# Patient Record
Sex: MALE | Race: WHITE | NOT HISPANIC OR LATINO | Employment: FULL TIME | URBAN - METROPOLITAN AREA
[De-identification: names, ages, dates, MRNs, and addresses within clinical notes are randomized per-mention and may not be internally consistent; named-entity substitution may affect disease eponyms.]

---

## 2018-01-10 NOTE — PROGRESS NOTES
Assessment    1  Central serous retinopathy, left (362 41) (H35 712)    Plan  Central serous retinopathy, left    · *1 - [address] Physician Referral  Consult  Status: Hold For - Scheduling   Requested for: [id number]  Care Summary provided  : Yes   · (1) CORTISOL AM SPECIMEN; Status:Active; Requested LTU:[id number];     Discussion/Summary    Pt would prefer home sleep study for CHRIS  Chief Complaint  Here to discuss sleep study order  er/cma  History of Present Illness  left eye issue  chris and cortisol levels recommended  stress at work  shift work  had left eye blurriness, right eye was ok, at night  not as bad in daytime  saw retinal specialist after seeing Dr Edin Rivera  snreagan  not sure if apnea  wants home study  normal labs in past  no wt change or striae       Review of Systems    Cardiovascular: no chest pain  Respiratory: no shortness of breath  Gastrointestinal: no abdominal pain  Active Problems    1  Acute maxillary sinusitis, recurrence not specified (461 0) (J01 00)   2  Central serous retinopathy, left (362 41) (H35 712)   3  Cerumen impaction (380 4) (H61 20)   4  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) (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)   5  Herpes simplex type 1 infection (054 9) (B00 9)   6  Immunization due (V05 9) (Z23)   7  Keloid scar (701 4) (L91 0)   8  Lipoma (214 9) (D17 9)   9  Lumbago (724 2) (M54 5)   10  Screening for diabetes mellitus (DM) (V77 1) (Z13 1)   11  Screening for lipid disorders (V77 91) (Z13 220)   12  Sebaceous cyst (706 2) (L72 3)    Past Medical History    1  History of Acute gastroenteritis (558 9) (K52 9)   2  Acute otitis media (382 9) (H66 90)   3  History of Acute upper respiratory infection (465 9) (J06 9)   4  Contact dermatitis due to plant (692 6) (L25 5)   5  History of Difficulty breathing (786 09) (R06 89)   6  Generalized abdominal pain (789 07) (R10 84)   7  History of acute bronchitis (V12 69) (Z87 09)   8  History of acute bronchitis (V12 69) (Z87 09)   9  History of acute sinusitis (V12 69) (Z87 09)   10  History of urticaria (V13 3) (Z87 2)   11  History of viral warts (V12 09) (Z86 19)   12  History of Nonvenomous Insect Bite Of Trunk (911 4)    Family History    1  Family history of malignant neoplasm of breast (V16 3) (Z80 3)    2  Family history of hypothyroidism (V18 19) (Z83 49)    The family history was reviewed and updated today  Social History    · Never A Smoker  The social history was reviewed and is unchanged  Current Meds   1  No Reported Medications Recorded   2  No Reported Medications Recorded    Allergies    1  No Known Drug Allergies    Vitals  Vital Signs [Data Includes: Current Encounter]    Recorded: [id number] 10:31AM   Temperature 97 7 F   Heart Rate 80   Respiration 20   Systolic 190   Diastolic 80   Height 6 ft 2 in   Weight 252 lb    BMI Calculated 32 36   BSA Calculated 2 4     Physical Exam    Constitutional   General appearance: No acute distress, well appearing and well nourished  Eyes   Conjunctiva and lids: No swelling, erythema, or discharge  Pupils and irises: Equal, round and reactive to light  Ears, Nose, Mouth, and Throat   External inspection of ears and nose: Normal     Otoscopic examination: Tympanic membrance translucent with normal light reflex  Canals patent without erythema  Nasal mucosa, septum, and turbinates: Normal without edema or erythema  Oropharynx: Normal with no erythema, edema, exudate or lesions  Pulmonary   Respiratory effort: No increased work of breathing or signs of respiratory distress  Auscultation of lungs: Clear to auscultation, equal breath sounds bilaterally, no wheezes, no rales, no rhonci  Cardiovascular   Auscultation of heart: Normal rate and rhythm, normal S1 and S2, without murmurs  Examination of extremities for edema and/or varicosities: Normal     Carotid pulses: Normal     Abdomen   Abdomen: Non-tender, no masses      Lymphatic   Palpation of lymph nodes in neck: No lymphadenopathy  Musculoskeletal   Gait and station: Normal     Digits and nails: Normal without clubbing or cyanosis  Inspection/palpation of joints, bones, and muscles: Normal     Skin   Skin and subcutaneous tissue: Normal without rashes or lesions  no striae or buffalo hump or moon facies     Psychiatric   Mood and affect: Normal          Signatures   Electronically signed by : Al Child DO; Jan 18 2016  9:59PM EST                       (Author)

## 2018-01-13 NOTE — RESULT NOTES
Verified Results  (1) CORTISOL AM SPECIMEN [id number] 07:07AM [facility]     Test Name Result Flag Reference   Cortisol - AM 15 8 ug/dL  6 2-19 4

## 2018-07-18 ENCOUNTER — OFFICE VISIT (OUTPATIENT)
Dept: FAMILY MEDICINE CLINIC | Facility: CLINIC | Age: 38
End: 2018-07-18
Payer: COMMERCIAL

## 2018-07-18 VITALS
HEART RATE: 64 BPM | TEMPERATURE: 96.4 F | HEIGHT: 75 IN | RESPIRATION RATE: 16 BRPM | BODY MASS INDEX: 30.84 KG/M2 | DIASTOLIC BLOOD PRESSURE: 86 MMHG | WEIGHT: 248 LBS | SYSTOLIC BLOOD PRESSURE: 120 MMHG

## 2018-07-18 DIAGNOSIS — Z91.89 FRAMINGHAM CARDIAC RISK <10% IN NEXT 10 YEARS: ICD-10-CM

## 2018-07-18 DIAGNOSIS — R79.9 ABNORMAL BLOOD CHEMISTRY: Primary | ICD-10-CM

## 2018-07-18 DIAGNOSIS — N18.30 STAGE 3 CHRONIC KIDNEY DISEASE (HCC): ICD-10-CM

## 2018-07-18 DIAGNOSIS — R73.01 IFG (IMPAIRED FASTING GLUCOSE): ICD-10-CM

## 2018-07-18 PROBLEM — B00.2 HERPES GINGIVOSTOMATITIS: Status: ACTIVE | Noted: 2017-05-11

## 2018-07-18 PROCEDURE — 99214 OFFICE O/P EST MOD 30 MIN: CPT | Performed by: FAMILY MEDICINE

## 2018-07-18 RX ORDER — VALACYCLOVIR HYDROCHLORIDE 1 G/1
TABLET, FILM COATED ORAL
COMMUNITY
Start: 2015-07-22 | End: 2018-07-24

## 2018-07-18 NOTE — PROGRESS NOTES
Assessment/Plan:    No problem-specific Assessment & Plan notes found for this encounter  Persistent slight elevation creatinine, recheck when hydrated and off supplements, check renal US, possible nephrology eval since required clearance for [employer] testing  Recheck fbs and a1c     Diagnoses and all orders for this visit:    Abnormal blood chemistry  -     Basic metabolic panel; Future  -     Ambulatory referral to Nephrology; Future    Stage 3 chronic kidney disease  -     US retroperitoneal complete; Future  -     Ambulatory referral to Nephrology; Future    IFG (impaired fasting glucose)  -     Hemoglobin A1C; Future    [place] cardiac risk <10% in next 10 years    Other orders  -     valACYclovir (VALTREX) 1,000 mg tablet; Take by mouth              No Follow-up on file  Subjective:      Patient ID: Lis Hayden is a 40 y o  male  Chief Complaint   Patient presents with    review bloodwork     prcma        HPI  Stopped supplements for exercise  No creatine supplements  Creatinine was 1 56  fbs 107  Labs noted on NJSP exam  Here for f/u  No probs with urination  Does have protein drinks and healthy diet  Usually drinks adequately    The following portions of the patient's history were reviewed and updated as appropriate: allergies, current medications, past family history, past medical history, past social history, past surgical history and problem list     Review of Systems   Respiratory: Negative for shortness of breath  Cardiovascular: Negative for chest pain  Genitourinary: Negative for difficulty urinating  Current Outpatient Prescriptions   Medication Sig Dispense Refill    valACYclovir (VALTREX) 1,000 mg tablet Take by mouth       No current facility-administered medications for this visit          Objective:    /86   Pulse 64   Temp (!) 96 4 °F (35 8 °C)   Resp 16   Ht 6' 3" (1 905 m)   Wt 112 kg (248 lb)   BMI 31 00 kg/m²        Physical Exam Constitutional: He appears well-developed  HENT:   Head: Normocephalic  Eyes: Conjunctivae are normal    Neck: Neck supple  Cardiovascular: Normal rate and intact distal pulses  No murmur heard  Pulmonary/Chest: Effort normal  No respiratory distress  Abdominal: Soft  He exhibits no distension  There is no tenderness  Musculoskeletal: He exhibits no edema or deformity  Neurological: He is alert  He exhibits normal muscle tone  Skin: Skin is warm and dry  No rash noted  No erythema  Psychiatric: His behavior is normal  Thought content normal    Nursing note and vitals reviewed               Canelo Medeiros DO

## 2018-07-19 ENCOUNTER — HOSPITAL ENCOUNTER (OUTPATIENT)
Dept: RADIOLOGY | Facility: HOSPITAL | Age: 38
Discharge: HOME | End: 2018-07-19
Attending: FAMILY MEDICINE
Payer: COMMERCIAL

## 2018-07-19 DIAGNOSIS — N18.30 STAGE 3 CHRONIC KIDNEY DISEASE (HCC): ICD-10-CM

## 2018-07-19 PROCEDURE — 76770 US EXAM ABDO BACK WALL COMP: CPT

## 2018-07-20 LAB
AMBIG ABBREV DEFAULT: NORMAL
BUN SERPL-MCNC: 18 MG/DL (ref 6–20)
BUN/CREAT SERPL: 12 (ref 9–20)
CALCIUM SERPL-MCNC: 9.5 MG/DL (ref 8.7–10.2)
CHLORIDE SERPL-SCNC: 98 MMOL/L (ref 96–106)
CO2 SERPL-SCNC: 26 MMOL/L (ref 20–29)
CREAT SERPL-MCNC: 1.45 MG/DL (ref 0.76–1.27)
GLUCOSE SERPL-MCNC: 112 MG/DL (ref 65–99)
HBA1C MFR BLD: 5.2 % (ref 4.8–5.6)
POTASSIUM SERPL-SCNC: 4.3 MMOL/L (ref 3.5–5.2)
SL AMB EGFR AFRICAN AMERICAN: 71 ML/MIN/1.73
SL AMB EGFR NON AFRICAN AMERICAN: 61 ML/MIN/1.73
SODIUM SERPL-SCNC: 139 MMOL/L (ref 134–144)

## 2018-07-24 ENCOUNTER — OFFICE VISIT (OUTPATIENT)
Dept: NEPHROLOGY | Facility: CLINIC | Age: 38
End: 2018-07-24
Payer: COMMERCIAL

## 2018-07-24 VITALS
DIASTOLIC BLOOD PRESSURE: 88 MMHG | HEART RATE: 60 BPM | SYSTOLIC BLOOD PRESSURE: 130 MMHG | HEIGHT: 75 IN | WEIGHT: 244 LBS | BODY MASS INDEX: 30.34 KG/M2

## 2018-07-24 DIAGNOSIS — R79.89 ELEVATED SERUM CREATININE: Primary | ICD-10-CM

## 2018-07-24 LAB
SL AMB  POCT GLUCOSE, UA: NORMAL
SL AMB LEUKOCYTE ESTERASE,UA: NORMAL
SL AMB POCT BILIRUBIN,UA: NORMAL
SL AMB POCT BLOOD,UA: NORMAL
SL AMB POCT CLARITY,UA: CLEAR
SL AMB POCT COLOR,UA: YELLOW
SL AMB POCT KETONES,UA: NORMAL
SL AMB POCT NITRITE,UA: NORMAL
SL AMB POCT PH,UA: 5
SL AMB POCT SPECIFIC GRAVITY,UA: 1
SL AMB POCT URINE PROTEIN: NORMAL
SL AMB POCT UROBILINOGEN: 0.2

## 2018-07-24 PROCEDURE — 99243 OFF/OP CNSLTJ NEW/EST LOW 30: CPT | Performed by: INTERNAL MEDICINE

## 2018-07-24 PROCEDURE — 81002 URINALYSIS NONAUTO W/O SCOPE: CPT | Performed by: INTERNAL MEDICINE

## 2018-07-24 NOTE — PATIENT INSTRUCTIONS
Will check 24 hr urine creatinine clearance as well as blood test to calculated EGFR based on cystatin  Follow-up in 6 months  Recommend decreasing the dose of Excedrin and avoiding altogether if possible

## 2018-07-24 NOTE — LETTER
July 24, 2018     Laith Louis, DO  [address] [address]    Patient: Alessia Palacios   YOB: 1980   Date of Visit: 7/24/2018       Dear Dr Everett List: Thank you for referring Too Duvall to me for evaluation  Below are my notes for this consultation  If you have questions, please do not hesitate to call me  I look forward to following your patient along with you  Sincerely,        Aramis Christensen MD        CC: No Recipients  Aramis Christensen MD  7/24/2018  9:05 AM  Sign at close encounter  [address] [place] [address], [address] 40 y o  male MRN: [id number]  Date: 7/24/2018  Reason for consultation:   Chief Complaint   Patient presents with    Consult    Chronic Kidney Disease       ASSESSMENT AND PLAN:  Elevated serum creatinine  -Patient has relatively stable renal function  Renal function fluctuating from 1 35 to 1 56  Slight increase in creatinine in may 2018 could be due to excessive intake of  Protein/creatine in dietary supplement and so creatinine improved in July to creatinine of 1 45, egfr 61 0  UA done in office is bland  Recommend avoiding creatine supplement and limiting protein intake to maximum 35 % of total caloric intake  -High creatinine could be due to body habitus and high muscle mass and high protein intake rather than true decline in egfr  Will check egfr using cystatin C as well as check urine creatinine clearance using 24 hours urine creatinine collection     -recommend avoiding nephrotoxins and NSAIDs    -discussed about low sodium diet and discussed about home monitoring of BP      1  Elevated serum creatinine  POCT urine dip    Cystatin C With eGFR    Creatinine Clearance 24 Hr    Creatinine, urine, 24 hour    Basic metabolic panel    Basic metabolic panel    CANCELED: POCT urine dip              HISTORY OF PRESENT ILLNESS:  Alessia Palacios is a 40y o  year old male with no significant medical issues who presents for initial consultation for elevated creatinine  Labs are as mentioned below  Patient had paperwork from his nurse with the labs from 2014 to may 2018 as mentioned below  2014- cr 1 43   05/11/2015- cr was 1 34 with egfr of 69  Blood work done on 07/20/2018 showed creatinine of 1 45 with egfr 61    2016- cr 1 54  2017- cr 1 35  05/22/2018- cr 1 56 egfr 56    Was taking creatine as supplement and stopped just one day before blood test   07/20/2018- cr 1 45 egfr 61     baseline creatinine is around 1 4  No urinary symptoms  Elevated creatinine is gradual onset, fluctuating but  Relatively stable, moderate severity  REVIEW OF SYSTEMS:    Review of Systems   Constitutional: Negative for activity change, appetite change, chills, diaphoresis, fatigue and fever  HENT: Negative for congestion, facial swelling and nosebleeds  Eyes: Negative for pain and visual disturbance  Respiratory: Negative for cough, chest tightness and shortness of breath  Cardiovascular: Negative for chest pain and palpitations  Gastrointestinal: Negative for abdominal distention, abdominal pain, diarrhea, nausea and vomiting  Genitourinary: Negative for difficulty urinating, dysuria, flank pain, frequency and hematuria  Musculoskeletal: Negative for arthralgias, back pain and joint swelling  Skin: Negative for rash  Neurological: Negative for dizziness, seizures, syncope, weakness and headaches  Psychiatric/Behavioral: Negative for agitation and confusion  The patient is not nervous/anxious  More than 10 point review of systems were obtained and discussed in length with the patient  PAST MEDICAL HISTORY:  Past Medical History:   Diagnosis Date    Central serous retinopathy        PAST SURGICAL HISTORY:  History reviewed  No pertinent surgical history      ALLERGIES:  No Known Allergies    SOCIAL HISTORY:  History   Alcohol Use    Yes     Comment: social     History   Drug Use No     History Smoking Status    Never Smoker   Smokeless Tobacco    Never Used       FAMILY HISTORY:  Family History   Problem Relation Age of Onset    Breast cancer Mother     Thyroid disease Father         hypothyroidism       MEDICATIONS:  No current outpatient prescriptions on file  PHYSICAL EXAM:  Vitals:    07/24/18 0751   BP: 130/88   Pulse: 60   Weight: 111 kg (244 lb)   Height: 6' 3" (1 905 m)     Body mass index is 30 5 kg/m²  Physical Exam   Constitutional: He is oriented to person, place, and time  Vital signs are normal  He appears well-developed  No distress  HENT:   Head: Normocephalic and atraumatic  Mouth/Throat: Oropharynx is clear and moist    Eyes: Conjunctivae are normal  Pupils are equal, round, and reactive to light  No scleral icterus  Neck: Neck supple  No thyromegaly present  Cardiovascular: Normal rate, regular rhythm and normal heart sounds  Exam reveals no friction rub  No murmur heard  Pulmonary/Chest: Effort normal and breath sounds normal  No respiratory distress  He has no wheezes  He has no rales  Abdominal: Soft  Bowel sounds are normal  He exhibits no distension  There is no tenderness  Musculoskeletal: He exhibits no edema or deformity  Lymphadenopathy:     He has no cervical adenopathy  Neurological: He is alert and oriented to person, place, and time  He is not disoriented  Skin: He is not diaphoretic  No cyanosis  No pallor  Nails show no clubbing  Psychiatric: He has a normal mood and affect  His mood appears not anxious  His affect is not inappropriate           Lab Results:   Results for orders placed or performed in visit on 07/24/18   POCT urine dip   Result Value Ref Range    LEUKOCYTE ESTERASE,UA neg      NITRITE,UA neg     SL AMB POCT UROBILINOGEN 0 2     SL AMB POCT URINE PROTEIN neg      PH,UA 5 0      BLOOD,UA neg      SPECIFIC GRAVITY,UA 1 005      KETONES,UA neg      BILIRUBIN,UA neg     GLUCOSE, UA neg      COLOR,UA yellow      CLARITY,UA clear       Ref Range & Units 7/19/18 0707 Flag   SL AMB GLUCOSE 65 - 99 mg/dL 112   H    BUN 6 - 20 mg/dL 18     Creatinine, Serum 0 76 - 1 27 mg/dL 1 45   H    eGFR Non  >59 mL/min/1 73 61     SL AMB EGFR AFRICAN AMERICAN >59 mL/min/1 73 71     SL AMB BUN/CREATININE RATIO 9 - 20 12     SL AMB SODIUM 134 - 144 mmol/L 139     SL AMB POTASSIUM 3 5 - 5 2 mmol/L 4 3     SL AMB CHLORIDE 96 - 106 mmol/L 98     SL AMB CARBON DIOXIDE 20 - 29 mmol/L 26     CALCIUM 8 7 - 10 2 mg/dL 9 5     Narrative     Performed at:  [facility] [employer]          Portions of the record may have been created with voice recognition software  Occasional wrong word or "sound a like" substitutions may have occurred due to the inherent limitations of voice recognition software  Read the chart carefully and recognize, using context, where substitutions have occurred

## 2018-07-24 NOTE — LETTER
July 24, 2018     Gavin Medel, DO  [address] [address]    Patient: Lis Hayden   YOB: 1980   Date of Visit: 7/24/2018       Dear Dr Francia Gross: Thank you for referring Mari Frank to me for evaluation  Below are my notes for this consultation  If you have questions, please do not hesitate to call me  I look forward to following your patient along with you  Sincerely,        Camden Figueroa MD        CC: No Recipients  Camden Figueroa MD  7/24/2018  9:02 AM  Sign at close encounter  [address] [place] [address], [address] 40 y o  male MRN: [id number]  Date: 7/24/2018  Reason for consultation:   Chief Complaint   Patient presents with    Consult    Chronic Kidney Disease       ASSESSMENT AND PLAN:  Elevated serum creatinine  -Patient has relatively stable renal function  Renal function fluctuating from 1 35 to 1 56  Slight increase in creatinine in may 2018 could be due to excessive intake of  Protein/creatine in dietary supplement and so creatinine improved in July to creatinine of 1 45, egfr 61 0  UA done in office is bland  Recommend avoiding creatine supplement and limiting protein intake to maximum 35 % of total caloric intake  -High creatinine could be due to body habitus and high muscle mass and high protein intake rather than true decline in egfr  Will check egfr using cystatin C as well as check urine creatinine clearance using 24 hours urine creatinine collection     -recommend avoiding nephrotoxins and NSAIDs    -discussed about low sodium diet and discussed about home monitoring of BP      1  Elevated serum creatinine  POCT urine dip    Cystatin C With eGFR    Creatinine Clearance 24 Hr    Creatinine, urine, 24 hour    Basic metabolic panel    Basic metabolic panel    CANCELED: POCT urine dip              HISTORY OF PRESENT ILLNESS:  Lis Hayden is a 40y o  year old male with medical issues of *** who presents for initial consultation for elevated creatinine  2014- cr 1 43   05/11/2015- cr was 1 34 with egfr of 69  Blood work done on 07/20/2018 showed creatinine of 1 45 with egfr 61    2016- cr 1 54  2017- cr 1 35  05/22/2018- cr 1 56 egfr 56    Was taking creatine as supplement and stopped just one day before blood test   07/20/2018- cr 1 45 egfr 61  REVIEW OF SYSTEMS:    Review of Systems   Constitutional: Negative for activity change, appetite change, chills, diaphoresis, fatigue and fever  HENT: Negative for congestion, facial swelling and nosebleeds  Eyes: Negative for pain and visual disturbance  Respiratory: Negative for cough, chest tightness and shortness of breath  Cardiovascular: Negative for chest pain and palpitations  Gastrointestinal: Negative for abdominal distention, abdominal pain, diarrhea, nausea and vomiting  Genitourinary: Negative for difficulty urinating, dysuria, flank pain, frequency and hematuria  Musculoskeletal: Negative for arthralgias, back pain and joint swelling  Skin: Negative for rash  Neurological: Negative for dizziness, seizures, syncope, weakness and headaches  Psychiatric/Behavioral: Negative for agitation and confusion  The patient is not nervous/anxious  More than 10 point review of systems were obtained and discussed in length with the patient  PAST MEDICAL HISTORY:  Past Medical History:   Diagnosis Date    Central serous retinopathy        PAST SURGICAL HISTORY:  History reviewed  No pertinent surgical history      ALLERGIES:  No Known Allergies    SOCIAL HISTORY:  History   Alcohol Use    Yes     Comment: social     History   Drug Use No     History   Smoking Status    Never Smoker   Smokeless Tobacco    Never Used       FAMILY HISTORY:  Family History   Problem Relation Age of Onset    Breast cancer Mother     Thyroid disease Father         hypothyroidism       MEDICATIONS:  No current outpatient prescriptions on file       PHYSICAL EXAM:  Vitals:    07/24/18 0751   BP: 130/88   Pulse: 60   Weight: 111 kg (244 lb)   Height: 6' 3" (1 905 m)     Body mass index is 30 5 kg/m²  Physical Exam   Constitutional: He is oriented to person, place, and time  Vital signs are normal  He appears well-developed  No distress  HENT:   Head: Normocephalic and atraumatic  Mouth/Throat: Oropharynx is clear and moist    Eyes: Conjunctivae are normal  Pupils are equal, round, and reactive to light  No scleral icterus  Neck: Neck supple  No thyromegaly present  Cardiovascular: Normal rate, regular rhythm and normal heart sounds  Exam reveals no friction rub  No murmur heard  Pulmonary/Chest: Effort normal and breath sounds normal  No respiratory distress  He has no wheezes  He has no rales  Abdominal: Soft  Bowel sounds are normal  He exhibits no distension  There is no tenderness  Musculoskeletal: He exhibits no edema or deformity  Lymphadenopathy:     He has no cervical adenopathy  Neurological: He is alert and oriented to person, place, and time  He is not disoriented  Skin: He is not diaphoretic  No cyanosis  No pallor  Nails show no clubbing  Psychiatric: He has a normal mood and affect  His mood appears not anxious  His affect is not inappropriate           Lab Results:   Results for orders placed or performed in visit on 07/24/18   POCT urine dip   Result Value Ref Range    LEUKOCYTE ESTERASE,UA neg      NITRITE,UA neg     SL AMB POCT UROBILINOGEN 0 2     SL AMB POCT URINE PROTEIN neg      PH,UA 5 0      BLOOD,UA neg      SPECIFIC GRAVITY,UA 1 005      KETONES,UA neg      BILIRUBIN,UA neg     GLUCOSE, UA neg      COLOR,UA yellow      CLARITY,UA clear       Ref Range & Units 7/19/18 0707 Flag   SL AMB GLUCOSE 65 - 99 mg/dL 112   H    BUN 6 - 20 mg/dL 18     Creatinine, Serum 0 76 - 1 27 mg/dL 1 45   H    eGFR Non  >59 mL/min/1 73 61     SL AMB EGFR AFRICAN AMERICAN >59 mL/min/1 73 71     SL AMB BUN/CREATININE RATIO 9 - 20 12     SL AMB SODIUM 134 - 144 mmol/L 139     SL AMB POTASSIUM 3 5 - 5 2 mmol/L 4 3     SL AMB CHLORIDE 96 - 106 mmol/L 98     SL AMB CARBON DIOXIDE 20 - 29 mmol/L 26     CALCIUM 8 7 - 10 2 mg/dL 9 5     Narrative     Performed at: [address] of the record may have been created with voice recognition software  Occasional wrong word or "sound a like" substitutions may have occurred due to the inherent limitations of voice recognition software  Read the chart carefully and recognize, using context, where substitutions have occurred

## 2018-07-24 NOTE — PROGRESS NOTES
NEPHROLOGY OUTPATIENT CONSULTATION   Last Guidry 40 y o  male MRN: [id number]  Date: 7/24/2018  Reason for consultation:   Chief Complaint   Patient presents with    Consult    Chronic Kidney Disease       ASSESSMENT AND PLAN:  Elevated serum creatinine  -Patient has relatively stable renal function  Renal function fluctuating from 1 35 to 1 56  Slight increase in creatinine in may 2018 could be due to excessive intake of  Protein/creatine in dietary supplement and so creatinine improved in July to creatinine of 1 45, egfr 61 0  UA done in office is bland  Recommend avoiding creatine supplement and limiting protein intake to maximum 35 % of total caloric intake  -High creatinine could be due to body habitus and high muscle mass and high protein intake rather than true decline in egfr  Will check egfr using cystatin C as well as check urine creatinine clearance using 24 hours urine creatinine collection  -recommend avoiding nephrotoxins and NSAIDs    -discussed about low sodium diet and discussed about home monitoring of BP      1  Elevated serum creatinine  POCT urine dip    Cystatin C With eGFR    Creatinine Clearance 24 Hr    Creatinine, urine, 24 hour    Basic metabolic panel    Basic metabolic panel    CANCELED: POCT urine dip              HISTORY OF PRESENT ILLNESS:  Last Guidry is a 40y o  year old male with no significant medical issues who presents for initial consultation for elevated creatinine  Labs are as mentioned below  Patient had paperwork from his nurse with the labs from 2014 to may 2018 as mentioned below  2014- cr 1 43   05/11/2015- cr was 1 34 with egfr of 69  Blood work done on 07/20/2018 showed creatinine of 1 45 with egfr 61    2016- cr 1 54  2017- cr 1 35  05/22/2018- cr 1 56 egfr 56    Was taking creatine as supplement and stopped just one day before blood test   07/20/2018- cr 1 45 egfr 61     baseline creatinine is around 1 4  No urinary symptoms  Elevated creatinine is gradual onset, fluctuating but  Relatively stable, moderate severity  REVIEW OF SYSTEMS:    Review of Systems   Constitutional: Negative for activity change, appetite change, chills, diaphoresis, fatigue and fever  HENT: Negative for congestion, facial swelling and nosebleeds  Eyes: Negative for pain and visual disturbance  Respiratory: Negative for cough, chest tightness and shortness of breath  Cardiovascular: Negative for chest pain and palpitations  Gastrointestinal: Negative for abdominal distention, abdominal pain, diarrhea, nausea and vomiting  Genitourinary: Negative for difficulty urinating, dysuria, flank pain, frequency and hematuria  Musculoskeletal: Negative for arthralgias, back pain and joint swelling  Skin: Negative for rash  Neurological: Negative for dizziness, seizures, syncope, weakness and headaches  Psychiatric/Behavioral: Negative for agitation and confusion  The patient is not nervous/anxious  More than 10 point review of systems were obtained and discussed in length with the patient  PAST MEDICAL HISTORY:  Past Medical History:   Diagnosis Date    Central serous retinopathy        PAST SURGICAL HISTORY:  History reviewed  No pertinent surgical history  ALLERGIES:  No Known Allergies    SOCIAL HISTORY:  History   Alcohol Use    Yes     Comment: social     History   Drug Use No     History   Smoking Status    Never Smoker   Smokeless Tobacco    Never Used       FAMILY HISTORY:  Family History   Problem Relation Age of Onset    Breast cancer Mother     Thyroid disease Father         hypothyroidism       MEDICATIONS:  No current outpatient prescriptions on file  PHYSICAL EXAM:  Vitals:    07/24/18 0751   BP: 130/88   Pulse: 60   Weight: 111 kg (244 lb)   Height: 6' 3" (1 905 m)     Body mass index is 30 5 kg/m²  Physical Exam   Constitutional: He is oriented to person, place, and time   Vital signs are normal  He appears well-developed  No distress  HENT:   Head: Normocephalic and atraumatic  Mouth/Throat: Oropharynx is clear and moist    Eyes: Conjunctivae are normal  Pupils are equal, round, and reactive to light  No scleral icterus  Neck: Neck supple  No thyromegaly present  Cardiovascular: Normal rate, regular rhythm and normal heart sounds  Exam reveals no friction rub  No murmur heard  Pulmonary/Chest: Effort normal and breath sounds normal  No respiratory distress  He has no wheezes  He has no rales  Abdominal: Soft  Bowel sounds are normal  He exhibits no distension  There is no tenderness  Musculoskeletal: He exhibits no edema or deformity  Lymphadenopathy:     He has no cervical adenopathy  Neurological: He is alert and oriented to person, place, and time  He is not disoriented  Skin: He is not diaphoretic  No cyanosis  No pallor  Nails show no clubbing  Psychiatric: He has a normal mood and affect  His mood appears not anxious  His affect is not inappropriate           Lab Results:   Results for orders placed or performed in visit on 07/24/18   POCT urine dip   Result Value Ref Range    LEUKOCYTE ESTERASE,UA neg      NITRITE,UA neg     SL AMB POCT UROBILINOGEN 0 2     SL AMB POCT URINE PROTEIN neg      PH,UA 5 0      BLOOD,UA neg      SPECIFIC GRAVITY,UA 1 005      KETONES,UA neg      BILIRUBIN,UA neg     GLUCOSE, UA neg      COLOR,UA yellow      CLARITY,UA clear       Ref Range & Units 7/19/18 0707 Flag   SL AMB GLUCOSE 65 - 99 mg/dL 112   H    BUN 6 - 20 mg/dL 18     Creatinine, Serum 0 76 - 1 27 mg/dL 1 45   H    eGFR Non  >59 mL/min/1 73 61     SL AMB EGFR AFRICAN AMERICAN >59 mL/min/1 73 71     SL AMB BUN/CREATININE RATIO 9 - 20 12     SL AMB SODIUM 134 - 144 mmol/L 139     SL AMB POTASSIUM 3 5 - 5 2 mmol/L 4 3     SL AMB CHLORIDE 96 - 106 mmol/L 98     SL AMB CARBON DIOXIDE 20 - 29 mmol/L 26     CALCIUM 8 7 - 10 2 mg/dL 9 5     Narrative     Performed at:  [facility] [place]          Portions of the record may have been created with voice recognition software  Occasional wrong word or "sound a like" substitutions may have occurred due to the inherent limitations of voice recognition software  Read the chart carefully and recognize, using context, where substitutions have occurred

## 2018-07-26 LAB
CYSTATIN C SERPL-MCNC: 0.72 MG/L
EXT GLUCOSE BLD: 102
EXTERNAL BUN: 23
EXTERNAL CALCIUM: 9.6
EXTERNAL CHLORIDE: 97
EXTERNAL CO2: 26
EXTERNAL CREATININE: 1.45
EXTERNAL EGFR: 71
EXTERNAL POTASSIUM: 4.7
EXTERNAL SODIUM: 136

## 2018-07-26 NOTE — PROGRESS NOTES
Can you call the lab and ask the unit in which they measure cystatin C  Is it mg/ dl or is it mg/L   Thank you

## 2018-07-28 ENCOUNTER — TELEPHONE (OUTPATIENT)
Dept: OTHER | Facility: HOSPITAL | Age: 38
End: 2018-07-28

## 2018-07-28 NOTE — TELEPHONE ENCOUNTER
Discussed with patient about the lab results and told him that his egfr based on combination of both creatinine and cystatin C was around 88  No proteinuria  Normal renal u/s  So no evidence of CKD  He will be doing 24 hr urine studies for creatinine clearance tomorrow- will follow up the result

## 2018-08-08 ENCOUNTER — TELEPHONE (OUTPATIENT)
Dept: OTHER | Facility: HOSPITAL | Age: 38
End: 2018-08-08

## 2018-08-08 NOTE — TELEPHONE ENCOUNTER
24 hr urine creatinine clearance is around 90  Patient likely does not have CKD as he does not have proteinuria and renal ultrasound is normal    Left message for patient  Also no need for nephrology follow up at this time  IF renal function worsens in future, can see him at that time

## 2018-08-28 ENCOUNTER — TELEPHONE (OUTPATIENT)
Dept: FAMILY MEDICINE CLINIC | Facility: CLINIC | Age: 38
End: 2018-08-28

## 2019-12-30 ENCOUNTER — OFFICE VISIT (OUTPATIENT)
Dept: URGENT CARE | Facility: CLINIC | Age: 39
End: 2019-12-30
Payer: COMMERCIAL

## 2019-12-30 VITALS
TEMPERATURE: 99 F | WEIGHT: 253 LBS | SYSTOLIC BLOOD PRESSURE: 116 MMHG | BODY MASS INDEX: 31.62 KG/M2 | HEART RATE: 93 BPM | OXYGEN SATURATION: 96 % | RESPIRATION RATE: 16 BRPM | DIASTOLIC BLOOD PRESSURE: 84 MMHG

## 2019-12-30 DIAGNOSIS — J02.9 SORE THROAT: ICD-10-CM

## 2019-12-30 DIAGNOSIS — J02.9 ACUTE VIRAL PHARYNGITIS: Primary | ICD-10-CM

## 2019-12-30 LAB — S PYO AG THROAT QL: NEGATIVE

## 2019-12-30 PROCEDURE — 87880 STREP A ASSAY W/OPTIC: CPT | Performed by: PHYSICIAN ASSISTANT

## 2019-12-30 PROCEDURE — 99213 OFFICE O/P EST LOW 20 MIN: CPT | Performed by: PHYSICIAN ASSISTANT

## 2019-12-30 NOTE — PROGRESS NOTES
[facility]        NAME: Dov Humphrey is a 44 y o  male  : 1980    MRN: [id number]  DATE: 2019  TIME: 8:43 AM    Assessment and Plan   Acute viral pharyngitis [J02 8, B97 89]  1  Acute viral pharyngitis     2  Sore throat  POCT rapid strepA         Patient Instructions   Acute Viral Pharyngitis:   -Rapid strep was negative  We discussed that this is likely a viral illness as there is no sign of bacterial infection on exam    -Warm salt water gargles and tea with honey may provide relief  Stay well hydrated and rest    -Run a humidifier next to your bed and take steam showers  -You can take Cepacol throat drops or spray for local pain relief of the throat  You can take Advil or Tylenol for fever or pain    -If your symptoms worsen or change follow up with your PCP for a recheck     Follow up with PCP in 3-5 days  Proceed to  ER if symptoms worsen  Chief Complaint     Chief Complaint   Patient presents with    Cold Like Symptoms     pt presents with voice loss, headache, vomit x 1, chest congestion, started 4 days ago         History of Present Illness       The patient presents today for a four day hx of pharyngitis, headache, nausea, one episode of emesis and congestion  He states that his worst presenting symptom today is the pharyngitis and hoarseness and that he is concerned that he has Strep Pharyngitis  He denies fever, chills, drooling, neck pain/swelling, abdominal pain, dizziness, weakness  He denies sick contacts or recent travel  He has good PO intake  No OTC measures have been attempted except for hot tea  Review of Systems   Review of Systems   Constitutional: Negative for activity change, appetite change, chills, diaphoresis, fatigue and fever  HENT: Positive for congestion, postnasal drip, rhinorrhea, sore throat and voice change   Negative for ear discharge, ear pain, facial swelling, hearing loss, sinus pressure, sinus pain, sneezing, tinnitus and trouble swallowing  Respiratory: Positive for cough  Negative for chest tightness, shortness of breath, wheezing and stridor  Cardiovascular: Negative for chest pain, palpitations and leg swelling  Gastrointestinal: Positive for nausea and vomiting  Negative for abdominal pain and diarrhea  Musculoskeletal: Negative for arthralgias, joint swelling, myalgias, neck pain and neck stiffness  Skin: Negative for rash  Allergic/Immunologic: Negative for immunocompromised state  Neurological: Negative for dizziness, weakness, light-headedness, numbness and headaches  Hematological: Negative for adenopathy  Current Medications     No current outpatient medications on file  Current Allergies     Allergies as of 12/30/2019    (No Known Allergies)            The following portions of the patient's history were reviewed and updated as appropriate: allergies, current medications, past family history, past medical history, past social history, past surgical history and problem list      Past Medical History:   Diagnosis Date    Central serous retinopathy     Patient denies medical problems        Past Surgical History:   Procedure Laterality Date    KNEE SURGERY      WISDOM TOOTH EXTRACTION         Family History   Problem Relation Age of Onset    Breast cancer Mother     Thyroid disease Father         hypothyroidism         Medications have been verified  Objective   /84   Pulse 93   Temp 99 °F (37 2 °C)   Resp 16   Wt 115 kg (253 lb)   SpO2 96%   BMI 31 62 kg/m²        Physical Exam     Physical Exam   Constitutional: He is oriented to person, place, and time  He appears well-developed and well-nourished  No distress  HENT:   Head: Normocephalic and atraumatic  Right Ear: Hearing, tympanic membrane, external ear and ear canal normal    Left Ear: Hearing, tympanic membrane, external ear and ear canal normal    Nose: Mucosal edema and rhinorrhea present   Right sinus exhibits no maxillary sinus tenderness and no frontal sinus tenderness  Left sinus exhibits no maxillary sinus tenderness and no frontal sinus tenderness  Mouth/Throat: Uvula is midline and mucous membranes are normal  No uvula swelling  Posterior oropharyngeal erythema present  No oropharyngeal exudate, posterior oropharyngeal edema or tonsillar abscesses  Tonsils are 1+ on the right  Tonsils are 1+ on the left  No tonsillar exudate  hoarseness   Neck: Normal range of motion  Neck supple  Cardiovascular: Normal rate, regular rhythm, S1 normal and S2 normal  Exam reveals no gallop, no S3, no S4, no distant heart sounds and no friction rub  No murmur heard  Pulmonary/Chest: No accessory muscle usage  No tachypnea and no bradypnea  No respiratory distress  He has no decreased breath sounds  He has no wheezes  He has no rhonchi  He has no rales  Lymphadenopathy:     He has no cervical adenopathy  Neurological: He is alert and oriented to person, place, and time  Skin: He is not diaphoretic  Psychiatric: He has a normal mood and affect  His behavior is normal    Nursing note and vitals reviewed

## 2019-12-30 NOTE — PATIENT INSTRUCTIONS
Pharyngitis   WHAT YOU NEED TO KNOW:   Pharyngitis, or sore throat, is inflammation of the tissues and structures in your pharynx (throat)  Pharyngitis is most often caused by bacteria  It may also be caused by a cold or flu virus  Other causes include smoking, allergies, or acid reflux  DISCHARGE INSTRUCTIONS:   Call 911 for any of the following:   · You have trouble breathing or swallowing because your throat is swollen or sore  Return to the emergency department if:   · You are drooling because it hurts too much to swallow  · Your fever is higher than 102? F (39?C) or lasts longer than 3 days  · You are confused  · You taste blood in your throat  Contact your healthcare provider if:   · Your throat pain gets worse  · You have a painful lump in your throat that does not go away after 5 days  · Your symptoms do not improve after 5 days  · You have questions or concerns about your condition or care  Medicines:  Viral pharyngitis will go away on its own without treatment  Your sore throat should start to feel better in 3 to 5 days for both viral and bacterial infections  You may need any of the following:  · Antibiotics  treat a bacterial infection  · NSAIDs , such as ibuprofen, help decrease swelling, pain, and fever  NSAIDs can cause stomach bleeding or kidney problems in certain people  If you take blood thinner medicine, always ask your healthcare provider if NSAIDs are safe for you  Always read the medicine label and follow directions  · Acetaminophen  decreases pain and fever  It is available without a doctor's order  Ask how much to take and how often to take it  Follow directions  Acetaminophen can cause liver damage if not taken correctly  · Take your medicine as directed  Contact your healthcare provider if you think your medicine is not helping or if you have side effects  Tell him or her if you are allergic to any medicine   Keep a list of the medicines, vitamins, and herbs you take  Include the amounts, and when and why you take them  Bring the list or the pill bottles to follow-up visits  Carry your medicine list with you in case of an emergency  Manage your symptoms:   · Gargle salt water  Mix ¼ teaspoon salt in an 8 ounce glass of warm water and gargle  This may help decrease swelling in your throat  · Drink liquids as directed  You may need to drink more liquids than usual  Liquids may help soothe your throat and prevent dehydration  Ask how much liquid to drink each day and which liquids are best for you  · Use a cool-steam humidifier  to help moisten the air in your room and calm your cough  · Soothe your throat  with cough drops, ice, soft foods, or popsicles  Prevent the spread of pharyngitis:  Cover your mouth and nose when you cough or sneeze  Do not share food or drinks  Wash your hands often  Use soap and water  If soap and water are unavailable, use an alcohol based hand   Follow up with your healthcare provider as directed:  Write down your questions so you remember to ask them during your visits  © 2017 [facility] Information is for End User's use only and may not be sold, redistributed or otherwise used for commercial purposes  All illustrations and images included in CareNotes® are the copyrighted property of A D A M , Inc  or Zach Sarmiento  The above information is an  only  It is not intended as medical advice for individual conditions or treatments  Talk to your doctor, nurse or pharmacist before following any medical regimen to see if it is safe and effective for you  Acute Viral Pharyngitis:   -Rapid strep was negative  We discussed that this is likely a viral illness as there is no sign of bacterial infection on exam    -Warm salt water gargles and tea with honey may provide relief  Stay well hydrated and rest    -Run a humidifier next to your bed and take steam showers     -You can take Cepacol throat drops or spray for local pain relief of the throat   You can take Advil or Tylenol for fever or pain    -If your symptoms worsen or change follow up with your PCP for a recheck

## 2020-11-23 ENCOUNTER — TELEPHONE (OUTPATIENT)
Dept: FAMILY MEDICINE CLINIC | Facility: CLINIC | Age: 40
End: 2020-11-23
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
## 2020-11-23 RX ORDER — DOXYCYCLINE 100 MG/1
CAPSULE ORAL
Qty: 2 CAPSULE | Refills: 0 | Status: SHIPPED | OUTPATIENT
Start: 2020-11-23 | End: 2020-11-24

## 2021-08-20 ENCOUNTER — TELEPHONE (OUTPATIENT)
Dept: FAMILY MEDICINE CLINIC | Facility: CLINIC | Age: 41
End: 2021-08-20

## 2021-09-16 ENCOUNTER — TELEMEDICINE (OUTPATIENT)
Dept: FAMILY MEDICINE CLINIC | Facility: CLINIC | Age: 41
End: 2021-09-16
Payer: COMMERCIAL

## 2021-09-16 VITALS — BODY MASS INDEX: 29.22 KG/M2 | WEIGHT: 235 LBS | HEIGHT: 75 IN

## 2021-09-16 DIAGNOSIS — Z20.822 EXPOSURE TO COVID-19 VIRUS: Primary | ICD-10-CM

## 2021-09-16 PROCEDURE — 3725F SCREEN DEPRESSION PERFORMED: CPT | Performed by: NURSE PRACTITIONER

## 2021-09-16 PROCEDURE — 99212 OFFICE O/P EST SF 10 MIN: CPT | Performed by: NURSE PRACTITIONER

## 2021-09-16 PROCEDURE — 3008F BODY MASS INDEX DOCD: CPT | Performed by: NURSE PRACTITIONER

## 2021-09-16 PROCEDURE — 1036F TOBACCO NON-USER: CPT | Performed by: NURSE PRACTITIONER

## 2021-09-16 NOTE — PROGRESS NOTES
COVID-19 Outpatient Progress Note    Assessment/Plan:    Problem List Items Addressed This Visit     None      Visit Diagnoses     Exposure to COVID-19 virus    -  Primary         Disposition:     After clarifying the patient's history, my suspicion for COVID-19 infection is very low  No need to test or quarantine  F/u as needed    I have spent 7 minutes directly with the patient  Verification of patient location:    Patient is located in the following state in which I hold an active license [place]    Encounter provider [facility]    Provider located at [address] [place] [id number]    Recent Visits  No visits were found meeting these conditions  Showing recent visits within past 7 days and meeting all other requirements  Today's Visits  Date Type Provider Dept   09/16/21 Telemedicine Jonathan Lopez today's visits and meeting all other requirements  Future Appointments  No visits were found meeting these conditions  Showing future appointments within next 150 days and meeting all other requirements     This virtual check-in was done via telephone and he agrees to proceed  Patient agrees to participate in a virtual check in via telephone or video visit instead of presenting to the office to address urgent/immediate medical needs  Patient is aware this is a billable service  After connecting through Telephone, the patient was identified by name and date of birth  Milana Munoz was informed that this was a telemedicine visit and that the exam was being conducted confidentially over secure lines  My office door was closed  No one else was in the room  Milana Munoz acknowledged consent and understanding of privacy and security of the telemedicine visit   I informed the patient that I have reviewed his record in Epic and presented the opportunity for him to ask any questions regarding the visit today  The patient agreed to participate  It was my intent to perform this visit via video technology but the patient was not able to do a video connection so the visit was completed via audio telephone only  Subjective:   Morena Haley is a 36 y o  male who is concerned about COVID-19  Patient is currently asymptomatic  Patient denies fever, chills, fatigue, malaise, congestion, rhinorrhea, sore throat, anosmia, loss of taste, cough, shortness of breath, chest tightness, abdominal pain, nausea, vomiting, diarrhea, myalgias and headaches  Date of exposure: 9/11/2021  COVID-19 vaccination status: Not vaccinated    Exposure:   Contact with a person who is under investigation (PUI) for or who is positive for COVID-19 within the last 14 days?: Yes    Hospitalized recently for fever and/or lower respiratory symptoms?: No      Currently a healthcare worker that is involved in direct patient care?: No      Works in a special setting where the risk of COVID-19 transmission may be high? (this may include long-term care, correctional and snf facilities; homeless shelters; assisted-living facilities and group homes ): No      Resident in a special setting where the risk of COVID-19 transmission may be high? 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  Low risk exposure, <15 minutes of direct contact less than 6 feet apart  Pt is not vaccinated and is asymptomatic  No results found for: Danika Rodríguez, [address] [facility] [address]  Past Medical History:   Diagnosis Date    Central serous retinopathy     Patient denies medical problems      Past Surgical History:   Procedure Laterality Date    KNEE SURGERY      WISDOM TOOTH EXTRACTION       No current outpatient medications on file       No current facility-administered medications for this visit  No Known Allergies    Review of Systems   Constitutional: Negative for chills, fatigue and fever  HENT: Negative for congestion, rhinorrhea and sore throat  Respiratory: Negative for cough, chest tightness and shortness of breath  Gastrointestinal: Negative for abdominal pain, diarrhea, nausea and vomiting  Musculoskeletal: Negative for myalgias  Neurological: Negative for headaches  Objective:    Vitals:    09/16/21 0838   Weight: 107 kg (235 lb)   Height: 6' 3" (1 905 m)       Physical Exam    VIRTUAL VISIT DISCLAIMER    Brenda Garza verbally agrees to participate in [employer]  Pt is aware that [employer] could be limited without vital signs or the ability to perform a full hands-on physical Christiano Awe understands he or the provider may request at any time to terminate the video visit and request the patient to seek care or treatment in person

## 2022-07-29 ENCOUNTER — OFFICE VISIT (OUTPATIENT)
Dept: FAMILY MEDICINE CLINIC | Facility: CLINIC | Age: 42
End: 2022-07-29
Payer: COMMERCIAL

## 2022-07-29 VITALS
TEMPERATURE: 96.1 F | RESPIRATION RATE: 17 BRPM | HEIGHT: 75 IN | HEART RATE: 75 BPM | SYSTOLIC BLOOD PRESSURE: 132 MMHG | BODY MASS INDEX: 30.06 KG/M2 | DIASTOLIC BLOOD PRESSURE: 84 MMHG | OXYGEN SATURATION: 99 % | WEIGHT: 241.8 LBS

## 2022-07-29 DIAGNOSIS — R93.1 ABNORMAL FINDING ON ECHOCARDIOGRAM: Primary | ICD-10-CM

## 2022-07-29 PROBLEM — J02.9 ACUTE VIRAL PHARYNGITIS: Status: RESOLVED | Noted: 2019-12-30 | Resolved: 2022-07-29

## 2022-07-29 PROCEDURE — 3725F SCREEN DEPRESSION PERFORMED: CPT | Performed by: FAMILY MEDICINE

## 2022-07-29 PROCEDURE — 99213 OFFICE O/P EST LOW 20 MIN: CPT | Performed by: FAMILY MEDICINE

## 2022-07-29 NOTE — PROGRESS NOTES
Assessment/Plan:    No problem-specific Assessment & Plan notes found for this encounter  Last echo with dilated aortic root  Formal copy of report pending  Echo f/u study ordered  If enlarging could refer to CTS  EF was normal  No significant valvulopathy     Diagnoses and all orders for this visit:    Abnormal finding on echocardiogram  -     Echo complete w/ contrast if indicated; Future        Return if symptoms worsen or fail to improve  Subjective:      Patient ID: Mena Sullivan is a 39 y o  male  Chief Complaint   Patient presents with    Follow-up     Review physical nm lpn       HPI  Leanest he's been  Exercising  Weights and cardio  Had well trooper exam at [facility]    Had echo done May 2021 and this year  Repeat was recommended and also required by [facility] medical office    Feels fine per pt    The following portions of the patient's history were reviewed and updated as appropriate: allergies, current medications, past family history, past medical history, past social history, past surgical history and problem list     Review of Systems   Respiratory: Negative for shortness of breath  Cardiovascular: Negative for chest pain  No current outpatient medications on file  No current facility-administered medications for this visit  Objective:    /84   Pulse 75   Temp (!) 96 1 °F (35 6 °C)   Resp 17   Ht 6' 3" (1 905 m)   Wt 110 kg (241 lb 12 8 oz)   SpO2 99%   BMI 30 22 kg/m²        Physical Exam  Vitals and nursing note reviewed  Constitutional:       Appearance: He is well-developed  He is not ill-appearing  HENT:      Head: Normocephalic  Right Ear: Tympanic membrane normal       Left Ear: Tympanic membrane normal    Eyes:      General: No scleral icterus  Conjunctiva/sclera: Conjunctivae normal    Cardiovascular:      Rate and Rhythm: Normal rate and regular rhythm  Heart sounds: No murmur heard    Pulmonary:      Effort: Pulmonary effort is normal  No respiratory distress  Breath sounds: No wheezing  Abdominal:      Palpations: Abdomen is soft  Musculoskeletal:         General: No deformity  Cervical back: Neck supple  Skin:     General: Skin is warm and dry  Coloration: Skin is not pale  Neurological:      Mental Status: He is alert  Psychiatric:         Mood and Affect: Mood normal          Behavior: Behavior normal          Thought Content:  Thought content normal                  Erika Fei, DO

## 2022-08-18 ENCOUNTER — HOSPITAL ENCOUNTER (OUTPATIENT)
Dept: NON INVASIVE DIAGNOSTICS | Facility: HOSPITAL | Age: 42
Discharge: HOME | End: 2022-08-18
Attending: FAMILY MEDICINE
Payer: COMMERCIAL

## 2022-08-18 VITALS
WEIGHT: 241 LBS | HEART RATE: 64 BPM | SYSTOLIC BLOOD PRESSURE: 124 MMHG | DIASTOLIC BLOOD PRESSURE: 66 MMHG | HEIGHT: 75 IN | BODY MASS INDEX: 29.97 KG/M2

## 2022-08-18 DIAGNOSIS — R93.1 ABNORMAL FINDING ON ECHOCARDIOGRAM: ICD-10-CM

## 2022-08-18 LAB
AORTIC ROOT: 3.7 CM
AV REGURGITATION PRESSURE HALF TIME: 593 MS
E WAVE DECELERATION TIME: 141 MS
E/A RATIO: 1.31
FRACTIONAL SHORTENING: 30 (ref 28–44)
INTERVENTRICULAR SEPTUM IN DIASTOLE (PARASTERNAL SHORT AXIS VIEW): 1 CM
INTERVENTRICULAR SEPTUM: 1 CM (ref 0.6–1.1)
LAAS-AP2: 19.8 CM2
LAAS-AP4: 18.2 CM2
LEFT ATRIUM SIZE: 4 CM
LEFT ATRIUM VOLUME INDEX (MOD BIPLANE): 16.8
LEFT INTERNAL DIMENSION IN SYSTOLE: 3.8 CM (ref 2.1–4)
LEFT VENTRICULAR INTERNAL DIMENSION IN DIASTOLE: 5.4 CM (ref 3.5–6)
LEFT VENTRICULAR POSTERIOR WALL IN END DIASTOLE: 1 CM
LEFT VENTRICULAR STROKE VOLUME: 78 ML
LVSV (TEICH): 78 ML
MV E'TISSUE VEL-LAT: 13 CM/S
MV E'TISSUE VEL-SEP: 12 CM/S
MV PEAK A VEL: 0.54 M/S
MV PEAK E VEL: 71 CM/S
MV STENOSIS PRESSURE HALF TIME: 41 MS
MV VALVE AREA P 1/2 METHOD: 5.4
RIGHT VENTRICLE ID DIMENSION: 3.7 CM
SL CV AV DECELERATION TIME RETROGRADE: 2045 MS
SL CV AV PEAK GRADIENT RETROGRADE: 84 MMHG
SL CV LEFT ATRIUM LENGTH A2C: 5.7 CM
SL CV LV EF: 60
SL CV PED ECHO LEFT VENTRICLE DIASTOLIC VOLUME (MOD BIPLANE) 2D: 141 ML
SL CV PED ECHO LEFT VENTRICLE SYSTOLIC VOLUME (MOD BIPLANE) 2D: 63 ML
TR MAX PG: 21 MMHG
TR PEAK VELOCITY: 2.3 M/S
TRICUSPID VALVE PEAK REGURGITATION VELOCITY: 2.26 M/S

## 2022-08-18 PROCEDURE — 93306 TTE W/DOPPLER COMPLETE: CPT

## 2022-08-18 PROCEDURE — 93306 TTE W/DOPPLER COMPLETE: CPT | Performed by: INTERNAL MEDICINE

## 2022-09-07 ENCOUNTER — OFFICE VISIT (OUTPATIENT)
Dept: FAMILY MEDICINE CLINIC | Facility: CLINIC | Age: 42
End: 2022-09-07
Payer: COMMERCIAL

## 2022-09-07 VITALS
HEIGHT: 75 IN | WEIGHT: 229 LBS | HEART RATE: 72 BPM | RESPIRATION RATE: 18 BRPM | DIASTOLIC BLOOD PRESSURE: 74 MMHG | SYSTOLIC BLOOD PRESSURE: 122 MMHG | BODY MASS INDEX: 28.47 KG/M2 | OXYGEN SATURATION: 99 % | TEMPERATURE: 97.8 F

## 2022-09-07 DIAGNOSIS — M25.551 RIGHT HIP PAIN: Primary | ICD-10-CM

## 2022-09-07 PROCEDURE — 3725F SCREEN DEPRESSION PERFORMED: CPT | Performed by: FAMILY MEDICINE

## 2022-09-07 PROCEDURE — 99213 OFFICE O/P EST LOW 20 MIN: CPT | Performed by: FAMILY MEDICINE

## 2022-09-07 RX ORDER — IBUPROFEN 200 MG
TABLET ORAL AS NEEDED
COMMUNITY

## 2022-09-07 RX ORDER — NAPROXEN 500 MG/1
500 TABLET ORAL 2 TIMES DAILY WITH MEALS
Qty: 30 TABLET | Refills: 0 | Status: SHIPPED | OUTPATIENT
Start: 2022-09-07

## 2022-09-07 NOTE — LETTER
September 7, 2022     Patient: Iris Archuleta  YOB: 1980  Date of Visit: 9/7/2022      To Whom it May Concern:    Ian Rivas is under my professional care  Ailin Alanis was seen in my office on 9/7/2022  Ailin Alanis has my permission to participate in the alternate physical fitness test based on his right hip injury  If you have any questions or concerns, please don't hesitate to call           Sincerely,          Dina Smith MD

## 2022-09-07 NOTE — PROGRESS NOTES
Assessment/Plan:     Diagnoses and all orders for this visit:    1  Right hip pain  Comments:  Counseled on rest, ice to the area, stretches  Will refer to ortho  Orders:  -     Ambulatory Referral to Orthopedic Surgery; Future  -     naproxen (Naprosyn) 500 mg tablet; Take 1 tablet (500 mg total) by mouth 2 (two) times a day with meals          Subjective:      Patient ID: Nazia Holcomb is a 39 y o  male  Hip Pain   Incident onset: 2 months ago  The incident occurred at the gym  Injury mechanism: occurred while squatting  The pain is present in the right hip  The quality of the pain is described as shooting  The pain is at a severity of 7/10  The pain is moderate  The pain has been intermittent since onset  Pertinent negatives include no inability to bear weight, loss of motion, loss of sensation, muscle weakness, numbness or tingling  He reports no foreign bodies present  The symptoms are aggravated by movement and palpation  He has tried NSAIDs for the symptoms  The treatment provided no relief  The following portions of the patient's history were reviewed and updated as appropriate: allergies, current medications, past family history, past medical history, past social history, past surgical history and problem list     Review of Systems   Constitutional: Negative  HENT: Negative  Eyes: Negative  Respiratory: Negative  Cardiovascular: Negative  Gastrointestinal: Negative  Endocrine: Negative  Genitourinary: Negative  Musculoskeletal: Negative  Right hip pain   Neurological: Negative  Negative for tingling and numbness  Hematological: Negative  Psychiatric/Behavioral: Negative  Objective:      /74   Pulse 72   Temp 97 8 °F (36 6 °C)   Resp 18   Ht 6' 3" (1 905 m)   Wt 104 kg (229 lb)   SpO2 99%   BMI 28 62 kg/m²          Physical Exam  Constitutional:       General: He is not in acute distress  Appearance: He is well-developed   He is not diaphoretic  HENT:      Head: Normocephalic and atraumatic  Eyes:      General: No scleral icterus  Right eye: No discharge  Left eye: No discharge  Conjunctiva/sclera: Conjunctivae normal    Pulmonary:      Effort: Pulmonary effort is normal    Musculoskeletal:      Cervical back: Normal range of motion  Right hip: Tenderness present  No deformity, lacerations, bony tenderness or crepitus  Normal range of motion  Normal strength  Left hip: Normal       Right upper leg: Normal    Skin:     General: Skin is warm  Neurological:      Mental Status: He is alert and oriented to person, place, and time  Psychiatric:         Behavior: Behavior normal          Thought Content:  Thought content normal          Judgment: Judgment normal

## 2022-09-19 ENCOUNTER — APPOINTMENT (OUTPATIENT)
Dept: RADIOLOGY | Facility: CLINIC | Age: 42
End: 2022-09-19
Payer: COMMERCIAL

## 2022-09-19 VITALS
WEIGHT: 229 LBS | HEIGHT: 75 IN | SYSTOLIC BLOOD PRESSURE: 129 MMHG | DIASTOLIC BLOOD PRESSURE: 84 MMHG | HEART RATE: 84 BPM | BODY MASS INDEX: 28.47 KG/M2

## 2022-09-19 DIAGNOSIS — M25.551 RIGHT HIP PAIN: ICD-10-CM

## 2022-09-19 DIAGNOSIS — M25.851 RIGHT HIP IMPINGEMENT SYNDROME: ICD-10-CM

## 2022-09-19 PROCEDURE — 73502 X-RAY EXAM HIP UNI 2-3 VIEWS: CPT

## 2022-09-19 PROCEDURE — 99203 OFFICE O/P NEW LOW 30 MIN: CPT | Performed by: ORTHOPAEDIC SURGERY

## 2022-09-19 NOTE — PROGRESS NOTES
Assessment/Plan:  1  Right hip impingement syndrome  Ambulatory Referral to Orthopedic Surgery    XR hip/pelv 2-3 vws right if performed    Ambulatory referral to Physical Therapy    Counseled on rest, ice to the area, stretches  Will refer to ortho  Promise Haynes has right-sided hip pain consistent with hip impingement syndrome  He does not have significant degenerative changes in his hip on x-ray  Most of his pain seems to be over the anterior portion of the hip and hip flexor  He may have a slight underlying labral injury however I would like for him to begin conservative measures of physical therapy  He verbalized understanding this plan  He may continue with naproxen  Follow-up after therapy if the pain persists  Subjective:   Nathan Saavedra is a 39 y o  male who presents to the office for evaluation for right-sided hip pain  He states that the pain started about 2 months ago as he was doing squats in the gym holding heavy weight  He felt strain in the right hip and groin region  He then started feeling pain into the lateral portion of his hip and buttock  The pain would increase doing a lot running  He has felt intermittent aching throbbing pain to the front of his hip and he did go to his PCP who referred him to our office today  He states that the naproxen he has been taking has been helping slightly  Denies any history of hip issues in the past     Review of Systems   Constitutional: Negative for chills, fever and unexpected weight change  HENT: Negative for hearing loss, nosebleeds and sore throat  Eyes: Negative for pain, redness and visual disturbance  Respiratory: Negative for cough, shortness of breath and wheezing  Cardiovascular: Negative for chest pain, palpitations and leg swelling  Gastrointestinal: Negative for abdominal pain, nausea and vomiting  Endocrine: Negative for polyphagia and polyuria  Genitourinary: Negative for dysuria and hematuria  Musculoskeletal:        See HPI   Skin: Negative for rash and wound  Neurological: Negative for dizziness, numbness and headaches  Psychiatric/Behavioral: Negative for decreased concentration and suicidal ideas  The patient is not nervous/anxious  Past Medical History:   Diagnosis Date    Central serous retinopathy     Patient denies medical problems        Past Surgical History:   Procedure Laterality Date    KNEE SURGERY      WISDOM TOOTH EXTRACTION         Family History   Problem Relation Age of Onset    Breast cancer Mother     Thyroid disease Father         hypothyroidism       Social History     Occupational History    Not on file   Tobacco Use    Smoking status: Never Smoker    Smokeless tobacco: Never Used   Vaping Use    Vaping Use: Never used   Substance and Sexual Activity    Alcohol use: Yes     Comment: social    Drug use: No    Sexual activity: Yes         Current Outpatient Medications:     ibuprofen (MOTRIN) 200 mg tablet, Take by mouth if needed for mild pain, Disp: , Rfl:     naproxen (Naprosyn) 500 mg tablet, Take 1 tablet (500 mg total) by mouth 2 (two) times a day with meals, Disp: 30 tablet, Rfl: 0    No Known Allergies    Objective:  Vitals:    09/19/22 1307   BP: 129/84   Pulse: 84       Right Hip Exam     Tenderness   The patient is experiencing tenderness in the anterior  Range of Motion   Abduction: normal   Adduction: normal   Extension: normal   Flexion:  130 abnormal   External rotation: normal   Internal rotation: normal     Muscle Strength   Abduction: 5/5   Adduction: 5/5   Flexion: 5/5     Tests   KATJA: negative    Other   Erythema: absent  Sensation: normal  Pulse: present    Comments:  Positive FADIR  Positive [facility]            Physical Exam  Vitals and nursing note reviewed  Constitutional:       Appearance: He is well-developed  HENT:      Head: Normocephalic and atraumatic  Eyes:      General: No scleral icterus       Extraocular Movements: Extraocular movements intact  Conjunctiva/sclera: Conjunctivae normal    Cardiovascular:      Rate and Rhythm: Normal rate  Pulmonary:      Effort: Pulmonary effort is normal  No respiratory distress  Musculoskeletal:      Cervical back: Normal range of motion and neck supple  Comments: As noted in HPI   Skin:     General: Skin is warm and dry  Neurological:      Mental Status: He is alert and oriented to person, place, and time  Psychiatric:         Behavior: Behavior normal          I have personally reviewed pertinent films in PACS and my interpretation is as follows:  X-rays of the right hip demonstrate no evidence of acute fracture or significant degenerative changes  This document was created using speech voice recognition software  Grammatical errors, random word insertions, pronoun errors, and incomplete sentences are an occasional consequence of this system due to software limitations, ambient noise, and hardware issues  Any formal questions or concerns about content, text, or information contained within the body of this dictation should be directly addressed to the provider for clarification

## 2022-09-19 NOTE — LETTER
September 19, 2022     Dina Smith MD  [address] [place] [address]    Patient: Iris Archuleta   YOB: 1980   Date of Visit: 9/19/2022       Dear Dr Tra Linton:    Thank you for referring Ian Rivas to me for evaluation  Below are my notes for this consultation  If you have questions, please do not hesitate to call me  I look forward to following your patient along with you  Sincerely,        Pérez Man DO        CC: No Recipients  Pérez Man DO  9/19/2022  1:51 PM  Sign when Signing Visit  Assessment/Plan:  1  Right hip impingement syndrome  Ambulatory Referral to Orthopedic Surgery    XR hip/pelv 2-3 vws right if performed    Ambulatory referral to Physical Therapy    Counseled on rest, ice to the area, stretches  Will refer to ortho  Ailin Zeke has right-sided hip pain consistent with hip impingement syndrome  He does not have significant degenerative changes in his hip on x-ray  Most of his pain seems to be over the anterior portion of the hip and hip flexor  He may have a slight underlying labral injury however I would like for him to begin conservative measures of physical therapy  He verbalized understanding this plan  He may continue with naproxen  Follow-up after therapy if the pain persists  Subjective:   Iris Archuleta is a 39 y o  male who presents to the office for evaluation for right-sided hip pain  He states that the pain started about 2 months ago as he was doing squats in the gym holding heavy weight  He felt strain in the right hip and groin region  He then started feeling pain into the lateral portion of his hip and buttock  The pain would increase doing a lot running  He has felt intermittent aching throbbing pain to the front of his hip and he did go to his PCP who referred him to our office today  He states that the naproxen he has been taking has been helping slightly    Denies any history of hip issues in the past     Review of Systems   Constitutional: Negative for chills, fever and unexpected weight change  HENT: Negative for hearing loss, nosebleeds and sore throat  Eyes: Negative for pain, redness and visual disturbance  Respiratory: Negative for cough, shortness of breath and wheezing  Cardiovascular: Negative for chest pain, palpitations and leg swelling  Gastrointestinal: Negative for abdominal pain, nausea and vomiting  Endocrine: Negative for polyphagia and polyuria  Genitourinary: Negative for dysuria and hematuria  Musculoskeletal:        See HPI   Skin: Negative for rash and wound  Neurological: Negative for dizziness, numbness and headaches  Psychiatric/Behavioral: Negative for decreased concentration and suicidal ideas  The patient is not nervous/anxious  Past Medical History:   Diagnosis Date    Central serous retinopathy     Patient denies medical problems        Past Surgical History:   Procedure Laterality Date    KNEE SURGERY      WISDOM TOOTH EXTRACTION         Family History   Problem Relation Age of Onset    Breast cancer Mother     Thyroid disease Father         hypothyroidism       Social History     Occupational History    Not on file   Tobacco Use    Smoking status: Never Smoker    Smokeless tobacco: Never Used   Vaping Use    Vaping Use: Never used   Substance and Sexual Activity    Alcohol use: Yes     Comment: social    Drug use: No    Sexual activity: Yes         Current Outpatient Medications:     ibuprofen (MOTRIN) 200 mg tablet, Take by mouth if needed for mild pain, Disp: , Rfl:     naproxen (Naprosyn) 500 mg tablet, Take 1 tablet (500 mg total) by mouth 2 (two) times a day with meals, Disp: 30 tablet, Rfl: 0    No Known Allergies    Objective:  Vitals:    09/19/22 1307   BP: 129/84   Pulse: 84       Right Hip Exam     Tenderness   The patient is experiencing tenderness in the anterior      Range of Motion   Abduction: normal   Adduction: normal   Extension: normal   Flexion:  130 abnormal   External rotation: normal   Internal rotation: normal     Muscle Strength   Abduction: 5/5   Adduction: 5/5   Flexion: 5/5     Tests   KATJA: negative    Other   Erythema: absent  Sensation: normal  Pulse: present    Comments:  Positive FADIR  Positive [facility]            Physical Exam  Vitals and nursing note reviewed  Constitutional:       Appearance: He is well-developed  HENT:      Head: Normocephalic and atraumatic  Eyes:      General: No scleral icterus  Extraocular Movements: Extraocular movements intact  Conjunctiva/sclera: Conjunctivae normal    Cardiovascular:      Rate and Rhythm: Normal rate  Pulmonary:      Effort: Pulmonary effort is normal  No respiratory distress  Musculoskeletal:      Cervical back: Normal range of motion and neck supple  Comments: As noted in HPI   Skin:     General: Skin is warm and dry  Neurological:      Mental Status: He is alert and oriented to person, place, and time  Psychiatric:         Behavior: Behavior normal          I have personally reviewed pertinent films in PACS and my interpretation is as follows:  X-rays of the right hip demonstrate no evidence of acute fracture or significant degenerative changes  This document was created using speech voice recognition software  Grammatical errors, random word insertions, pronoun errors, and incomplete sentences are an occasional consequence of this system due to software limitations, ambient noise, and hardware issues  Any formal questions or concerns about content, text, or information contained within the body of this dictation should be directly addressed to the provider for clarification

## 2023-09-05 ENCOUNTER — TELEPHONE (OUTPATIENT)
Dept: FAMILY MEDICINE CLINIC | Facility: CLINIC | Age: 43
End: 2023-09-05

## 2023-09-05 NOTE — TELEPHONE ENCOUNTER
Patient calling for a note from Dr. Gerhardt Daubs. He is a [place] and he takes a physical test with his job. He will be doing the Biking instead for run test.  He will need a note from his doctor letting him do this.   Please see below what needs to be stated in the note    No [place] has been given my permission to participate in the alternate physical test.      Please call patient when note is ready for  or if Dr. Gerhardt Daubs has any questions

## 2023-09-06 NOTE — TELEPHONE ENCOUNTER
Called and spoke with patient. He is aware letter is ready for . He may call back with a fax number to fax the letter.   For now he is aware the letter is in patient     No further action needed

## 2024-09-16 ENCOUNTER — OFFICE VISIT (OUTPATIENT)
Dept: FAMILY MEDICINE CLINIC | Facility: CLINIC | Age: 44
End: 2024-09-16
Payer: COMMERCIAL

## 2024-09-16 VITALS
RESPIRATION RATE: 16 BRPM | BODY MASS INDEX: 29.69 KG/M2 | SYSTOLIC BLOOD PRESSURE: 128 MMHG | WEIGHT: 238.8 LBS | DIASTOLIC BLOOD PRESSURE: 80 MMHG | HEIGHT: 75 IN | TEMPERATURE: 97 F | HEART RATE: 62 BPM

## 2024-09-16 DIAGNOSIS — M25.562 ARTHRALGIA OF BOTH KNEES: ICD-10-CM

## 2024-09-16 DIAGNOSIS — M25.561 ARTHRALGIA OF BOTH KNEES: ICD-10-CM

## 2024-09-16 DIAGNOSIS — B00.2 HERPES GINGIVOSTOMATITIS: Primary | ICD-10-CM

## 2024-09-16 DIAGNOSIS — M77.9 TENDONITIS: ICD-10-CM

## 2024-09-16 PROBLEM — N18.30 STAGE 3 CHRONIC KIDNEY DISEASE (HCC): Status: RESOLVED | Noted: 2018-07-18 | Resolved: 2024-09-16

## 2024-09-16 PROBLEM — R73.01 IFG (IMPAIRED FASTING GLUCOSE): Status: RESOLVED | Noted: 2018-07-18 | Resolved: 2024-09-16

## 2024-09-16 PROCEDURE — 99214 OFFICE O/P EST MOD 30 MIN: CPT | Performed by: FAMILY MEDICINE

## 2024-09-16 RX ORDER — VALACYCLOVIR HYDROCHLORIDE 1 G/1
TABLET, FILM COATED ORAL
Qty: 40 TABLET | Refills: 1 | Status: SHIPPED | OUTPATIENT
Start: 2024-09-16 | End: 2025-09-01

## 2024-09-16 NOTE — PROGRESS NOTES
"Assessment/Plan:    No problem-specific Assessment & Plan notes found for this encounter.    Arthralgias/tendonitis with high impact exercise  Letter for alternate C20 testing at [facility] written  Had screenings done at [facility] program  PSA?    Can try tumeric or glucosamine  Could offer sports medicine referral or shoulder specialist in future    Hsv labialis  Valtrex dosing reviewed     Diagnoses and all orders for this visit:    Herpes gingivostomatitis  -     valACYclovir (VALTREX) 1,000 mg tablet; 2 tabs at earliest onset of cold sore, repeat once in 12 hours    Tendonitis    Arthralgia of both knees        Return if symptoms worsen or fail to improve.    Subjective:      Patient ID: Jose Mejias is a 43 y.o. male.    Chief Complaint   Patient presents with    [facility]     CMA         HPI  Knees and hips hurt with running  Nonimpact is good  No major arthritis    C20  Thinking about CBD    Hsv every 1-2y, not frequent  UV/stress triggers aware    The following portions of the patient's history were reviewed and updated as appropriate: allergies, current medications, past family history, past medical history, past social history, past surgical history and problem list.    Review of Systems   Respiratory:  Negative for shortness of breath.    Cardiovascular:  Negative for chest pain.   Musculoskeletal:  Positive for arthralgias.         Current Outpatient Medications   Medication Sig Dispense Refill    valACYclovir (VALTREX) 1,000 mg tablet 2 tabs at earliest onset of cold sore, repeat once in 12 hours 40 tablet 1     No current facility-administered medications for this visit.       Objective:    /80   Pulse 62   Temp (!) 97 °F (36.1 °C) (Tympanic)   Resp 16   Ht 6' 3\" (1.905 m)   Wt 108 kg (238 lb 12.8 oz)   BMI 29.85 kg/m²        Physical Exam  Vitals and nursing note reviewed.   Constitutional:       General: He is not in acute distress.     Appearance: He is well-developed. He is " not ill-appearing.   HENT:      Head: Normocephalic.      Right Ear: Tympanic membrane normal.      Left Ear: Tympanic membrane normal.      Mouth/Throat:      Mouth: Mucous membranes are moist.   Eyes:      Conjunctiva/sclera: Conjunctivae normal.   Cardiovascular:      Rate and Rhythm: Normal rate and regular rhythm.      Heart sounds: No murmur heard.  Pulmonary:      Effort: Pulmonary effort is normal. No respiratory distress.      Breath sounds: No wheezing.   Abdominal:      Palpations: Abdomen is soft.   Musculoskeletal:         General: No deformity.      Cervical back: Neck supple.   Skin:     General: Skin is warm and dry.      Coloration: Skin is not pale.   Neurological:      Mental Status: He is alert.      Motor: No weakness.      Gait: Gait normal.   Psychiatric:         Mood and Affect: Mood normal.         Behavior: Behavior normal.         Thought Content: Thought content normal.                Eugenio Seay DO

## 2024-09-16 NOTE — LETTER
September 16, 2024     Patient: Jose Mejias  YOB: 1980  Date of Visit: 9/16/2024      To Whom it May Concern:    Jose Mejias is under my professional care. Jose was seen in my office on 9/16/2024.     Jose has been given my permission to participate in the alternate physical test.     If you have any questions or concerns, please don't hesitate to call.         Sincerely,          Eugenio Seay,         CC:   No Recipients